# Patient Record
Sex: FEMALE | Race: WHITE
[De-identification: names, ages, dates, MRNs, and addresses within clinical notes are randomized per-mention and may not be internally consistent; named-entity substitution may affect disease eponyms.]

---

## 2019-11-10 NOTE — NUR
JULIA FROM SNF FOR NAUSEA, VOMITING AND DIARRHEA. TO ER BED 4, HOOKED TO 
MONITOR, CHANGED TO HOSP [facility], AWAITING MD LOPEZ.

## 2019-11-10 NOTE — NUR
MS RN NOTE



PT ARRIVED TO FLOOR VIA GURNEY ACCOMPANIED BY ER STAFF. RECEIVED PT IN STABLE CONDITION. A/O 
X1. PT CURRENTLY DROWSY, AND SLEEPY, NEEDED TO REPEAT QUESTIONS BEFORE SHE CAN ANSWER. NO 
SIGNS OF SOB OR DISTRESS, NO INDICATION OF PAIN. NO N/V NOTED. IV IN L AC #20 IN PLACE S/L. 
ALL CURRENT NEEDS ATTENDED TO. BED LOW, LOCKED, UPPER RAILS UP, AND CALL LIGHT WITHIN REACH. 
PT BODY ASSESSED, AND BELONGINGS ACCOUNTED AND SIGNED FOR. WILL CONT. TO MONITOR.

## 2019-11-11 NOTE — NUR
RN NOTE



PT PULLED OUT IV AT THIS TIME AND AT RISK FOR FALLS DUE HER TRYING TO GET OUT OF BED, CHARGE 
MALLORIE SOMERS MADE AWARE REQUESTED SITTER AT THIS TIME. WILL MONITOR ACCORDINGLY

## 2019-11-11 NOTE — NUR
MS RN NOTE



PT REMAINS IN STABLE CONDITION. A/O X1. PT CURRENTLY ASLEEP. NO SIGNS OF SOB OR DISTRESS, NO 
INDICATION OF PAIN. NO N/V NOTED. IV IN L AC #20 IN PLACE WITH IVF INFUSING, TOLERATING 
WELL. ALL CURRENT NEEDS ATTENDED TO. BED LOW, LOCKED, UPPER RAILS UP, AND CALL LIGHT WITHIN 
REACH AND ENDORSE TO NEXT SHIFT FOR GRACIE.

## 2019-11-11 NOTE — NUR
RN CLOSING NOTE



PT IN BED AT LOWEST AND LOCKED POSITION WITH SIDE RAILS UP X2, A/O X1-2 BREATHING EVEN AND 
UNLABORED ON RA, IV IS PATENT AND INTACT, SITTER AT BEDSIDE, CURRENTLY NPO, SAFETY 
PRECAUTIONS IN PLACE, CALL LIGHT IN REACH, ALL NEEDS ATTENDED TO THROUGHOUT SHIFT, WILL 
ENDORSE TO NIGHT RN FOR GRACIE.

## 2019-11-11 NOTE — NUR
RN NOTE 



PHARMACY CALLED REGARDING IV PHOSPHORUS, INFORMED THAT IT IS ON ITS WAY, WILL AWAIT FOR MED 
TO BE BROUGHT UP

## 2019-11-11 NOTE — NUR
RN NOTES



RECEIVED PATIENT AWAKE IN BED CALM RESTING COMFORTABLY, IV ACCESS INTACT AND PATENT, SITTER 
AT BEDSIDE, SAFETY MEASURES INPLACE, ASPIRATION PRECAUTION MAINTAINED, CALL LIGHT WITHIN 
EASY REACH, NO SIGNS OF DISTRESS, WILL CONTINUE TO MONITOR ACCORDINGLY.

## 2019-11-11 NOTE — NUR
RN OPENING NOTE



PT WAS RECEIVED IN BED AT LOWEST AND LOCKED POSITION WITH SIDE RAILS UP X2, A/O X1-2 
BREATHING EVEN AND UNLABORED ON RA, ON BEDREST SKIN INTACT, IV IS PATENT AND INTACT, NOTED 
THAT PT IS NPO AWAITING GI CONSULT, SAFETY PRECAUTIONS IN PLACE, CALL LIGHT IN REACH, WILL 
MONITOR ACCORDINGLY

## 2019-11-12 NOTE — NUR
RN NOTES



ALL NEEDS ATTENDED AND MET. BILATERAL SOFT WRIST RESTRAINTS ON, MONITORED FOR ADEQUATE 
CIRCULATION AND SKIN INTEGRITY, REPOSITIONED FOR COMFORT, SITTER AT BEDSIDE, SAFETY MEASURES 
IN PLACE,ENDORSED TO AM NURSE FOR CONTINUITY OF CARE.

## 2019-11-12 NOTE — NUR
RN NOTES



ATIVAN 0.5 MG IV GIVEN AS PER MD ORDER FOR AGITATION, AND RESTLESSNESS, SITTER AT BEDSIDE. 
SAFETY MEASURES INPLACE.

## 2019-11-12 NOTE — NUR
rn notes/assessment:

pt in bed, awake, a/o x1. sitter at bed side. received with bilateral soft wrist restraint. 
pt trying to hit staff and throw food and liquids. iv noted on left fa g 22 patent and 
flushing well, infusing with d51/2 ns at 80ml/hr. no s/s of iv infiltration noted. restraint 
protocol followed. pt able to move and wiggle arm and hands. with good capillary refill 
noted. pedal pulses intact. no s/s of impediment in circulation noted. ble offloaded on 
pillows. safety precautions for fall engaged, call light in reach, will continue monitoring 
pt.

## 2019-11-12 NOTE — NUR
SPOKE WITH PATIENT'S  CONSERVATIVE DILSHAD DOWD TO GET A CONSENT FOR EGD. CONSERVATOR 
CANNOT SIGH A CONSENT FOR INVASIVE PROCEDURES. SHE WILL SEND A FORM THAT HAS TO BE SIGHED BY 
MEDICAL DOCTOR AND PHYSIATRIST AND THAN SEND BACK TO HER

## 2019-11-12 NOTE — NUR
RN NOTES



NEW IV INSERTED IN LEFT FOREARM GAUGE 22, SITTER AT BEDSIDE, WILL CONTINUE TO MONITOR 
ACCORDINGLY.

## 2019-11-12 NOTE — NUR
PATIENT IN BED.A/OX1, REMAINS CONFUSED. VS ARE STABLE AND PATIENT ON ROOM AIR.  BILATERAL 
SOFT WRIST RESTRAINTS ON, MONITORED FOR ADEQUATE CIRCULATION AND SKIN INTEGRITY, SITTER AT 
BEDSIDE, SAFETY MEASURES IN PLACE,ENDORSED TO NEXT SHIFT NURSE FOR CONTINUITY OF CARE.

## 2019-11-13 NOTE — NUR
RN CLOSING NOTES:

PT REMAINS ON ASPIRATION PRECAUTIONS. BILATERAL SOFT WRIST RESTRAINT IN PLACED, BILATERAL 
RADIAL PULSES INTACT, WITH GOOD CAPILLARY REFILL NOTED, PT ABLE TO MOVE AND WIGGLE ARMS. NO 
S/S OF IMPEDIMENT IN CIRCULATION NOTED. BLE OFFLOADED ON PILLOWS. SAFETY PRECAUTIONS FOR 
FALL REMAINS ENGAGED, CALL LIGHT IN REACH, ENDORSE TO ALTAF RN FOR CONTINUITY OF CARE.

## 2019-11-13 NOTE — NUR
RN MS NOTES

 CALLED AND SPOKE TO SILVINA NOBLES RECEIVING NURSE AT [facility] TO 
GIVE REPORT. [phone]

 REPORT GIVEN TO SILVINA NOBLES PATIENT WILL BE GOING TO UNIT TWO.

TRANSPORTATION AT BEDSIDE, FIRST MED, DISCHARGE PAPERWORK GIVEN TO AMBULANCE STAFF.

IV SITE REMOVED, ID BAND REMOVED, NO PERSONAL BELONGINGS NOTED WITH PATIENT AT THIS TIME.

## 2019-11-13 NOTE — NUR
RN MS NOTES

RECEIVED CALL FROM  FROM [facility] STATING, "PATIENT WILL BE ACCEPTED TO [facility] AND BED IS AVAILABLE AND TRANSPORTATION WILL BE PROVIDED 
WITHIN 30-40 MINS".

CALL BACK NUMBER [phone]

## 2019-11-13 NOTE — NUR
RN MS NOTES

 RECEIVED PATIENT IN BED AWAKE ALERT AND ORIENTED X1, ABLE TO MAKE SIMPLE NEEDS KNOWN NOTED, 
FORGETFUL, RESPIRATIONS EVEN AND UNLABORED WITH EQUAL RISE AND FALL OF CHEST, DENIES ANY 
PAIN OR DISCOMFORT AT THIS TIME IV SITE TO RIGHT WRIST #20G INTACT AND PATENT, NO REDNESS, 
NO INFILTRATION PRESENT, ORIENTED TO STAFF AND CALL LIGHT AND KEPT WITHIN REACH, SAFETY 
PRECAUTIONS IN PLACE, LOW BED AND LOCKED, BED ALARM IN PLACE, SITTER AT BEDSIDE, ALL NEEDS 
ATTENDED AT THIS TIME DISCUSSED PLAN OF CARE, AWAITING TO BE DISCHARGED. AT THIS TIME 
REMAINS COMFORTABLE.

## 2019-11-13 NOTE — NUR
PATIENT REMAIN STABLE ON ROOM AIR, SITTER AT BEDSIDE. PATIENT COOPERATIVE AND ALERT 
/ORIENTED X2. ALL NEEDS ATTENDED. PATIENT ON SOFT DIET TOLERATED WELL. PATIENT AMBULATORY 
WITH ASSISTANCE. IV LINE REMAINS INTACT AND PATENT H/L. SAFETY PRECAUTIONS IN PLACE. CALL 
LIGHT WITHIN REACH. WILL ENDORSE TO NEXT SHIFT FOR GRACIE.

## 2019-11-13 NOTE — NUR
RN MS CLOSING NOTES

 PATIENT LEFT IN STABLE CONDITION WITH FIRST [facility] AMBULANCE STAFF. 

PATIENT REFUSED HALDOL AS SCHEDULED.

## 2019-11-13 NOTE — NUR
WOUND CARE CONSULT: PT PRESENTS WITH INTACT SKIN. PT IS INCONTINENT. RECOMMENDATIONS MADE 
FOR SKIN PROTECTION. DISCUSSED WITH NURSING STAFF. WILL SEE PRN. CURRENT BLESSING SCORE IS 13.

## 2019-11-27 ENCOUNTER — HOSPITAL ENCOUNTER (INPATIENT)
Dept: HOSPITAL 54 - ER | Age: 74
LOS: 4 days | Discharge: PSYCHIATRIC OR SUBSTANCE USE FACILITY | DRG: 388 | End: 2019-12-01
Attending: NURSE PRACTITIONER | Admitting: NURSE PRACTITIONER
Payer: MEDICARE

## 2019-11-27 VITALS — WEIGHT: 138 LBS | BODY MASS INDEX: 25.4 KG/M2 | HEIGHT: 62 IN

## 2019-11-27 DIAGNOSIS — I25.10: ICD-10-CM

## 2019-11-27 DIAGNOSIS — R45.851: ICD-10-CM

## 2019-11-27 DIAGNOSIS — K44.9: ICD-10-CM

## 2019-11-27 DIAGNOSIS — D68.69: ICD-10-CM

## 2019-11-27 DIAGNOSIS — G89.29: ICD-10-CM

## 2019-11-27 DIAGNOSIS — K59.09: ICD-10-CM

## 2019-11-27 DIAGNOSIS — F03.90: ICD-10-CM

## 2019-11-27 DIAGNOSIS — K22.0: ICD-10-CM

## 2019-11-27 DIAGNOSIS — J44.9: ICD-10-CM

## 2019-11-27 DIAGNOSIS — R91.1: ICD-10-CM

## 2019-11-27 DIAGNOSIS — K56.7: Primary | ICD-10-CM

## 2019-11-27 DIAGNOSIS — E43: ICD-10-CM

## 2019-11-27 DIAGNOSIS — F20.9: ICD-10-CM

## 2019-11-27 DIAGNOSIS — E83.42: ICD-10-CM

## 2019-11-27 LAB
ALBUMIN SERPL BCP-MCNC: 3.7 G/DL (ref 3.4–5)
ALP SERPL-CCNC: 56 U/L (ref 46–116)
ALT SERPL W P-5'-P-CCNC: 25 U/L (ref 12–78)
AST SERPL W P-5'-P-CCNC: 19 U/L (ref 15–37)
BASOPHILS # BLD AUTO: 0 /CMM (ref 0–0.2)
BASOPHILS NFR BLD AUTO: 0.1 % (ref 0–2)
BILIRUB DIRECT SERPL-MCNC: 0.1 MG/DL (ref 0–0.2)
BILIRUB SERPL-MCNC: 0.4 MG/DL (ref 0.2–1)
BILIRUB UR QL STRIP: (no result)
BUN SERPL-MCNC: 36 MG/DL (ref 7–18)
CALCIUM SERPL-MCNC: 9.9 MG/DL (ref 8.5–10.1)
CHLORIDE SERPL-SCNC: 104 MMOL/L (ref 98–107)
CO2 SERPL-SCNC: 23 MMOL/L (ref 21–32)
COLOR UR: (no result)
CREAT SERPL-MCNC: 1.3 MG/DL (ref 0.6–1.3)
EOSINOPHIL NFR BLD AUTO: 0.1 % (ref 0–6)
GLUCOSE SERPL-MCNC: 117 MG/DL (ref 74–106)
HCT VFR BLD AUTO: 41 % (ref 33–45)
HGB BLD-MCNC: 13.2 G/DL (ref 11.5–14.8)
KETONES UR STRIP-MCNC: 15 MG/DL
LIPASE SERPL-CCNC: 103 U/L (ref 73–393)
LYMPHOCYTES NFR BLD AUTO: 0.3 /CMM (ref 0.8–4.8)
LYMPHOCYTES NFR BLD AUTO: 1.8 % (ref 20–44)
LYMPHOCYTES NFR BLD MANUAL: 0 % (ref 16–48)
MCHC RBC AUTO-ENTMCNC: 32 G/DL (ref 31–36)
MCV RBC AUTO: 86 FL (ref 82–100)
MONOCYTES NFR BLD AUTO: 0.7 /CMM (ref 0.1–1.3)
MONOCYTES NFR BLD AUTO: 3.8 % (ref 2–12)
MONOCYTES NFR BLD MANUAL: 2 % (ref 0–11)
NEUTROPHILS # BLD AUTO: 16.2 /CMM (ref 1.8–8.9)
NEUTROPHILS NFR BLD AUTO: 94.2 % (ref 43–81)
NEUTS BAND NFR BLD MANUAL: 13 % (ref 0–5)
NEUTS SEG NFR BLD MANUAL: 85 % (ref 42–76)
PH UR STRIP: 5 [PH] (ref 5–8)
PLATELET # BLD AUTO: 283 /CMM (ref 150–450)
POTASSIUM SERPL-SCNC: 4.3 MMOL/L (ref 3.5–5.1)
PROT SERPL-MCNC: 7.8 G/DL (ref 6.4–8.2)
PROT UR QL STRIP: 30 MG/DL
RBC # BLD AUTO: 4.78 MIL/UL (ref 4–5.2)
RBC #/AREA URNS HPF: (no result) /HPF (ref 0–2)
SODIUM SERPL-SCNC: 140 MMOL/L (ref 136–145)
UROBILINOGEN UR STRIP-MCNC: 1 EU/DL
WBC #/AREA URNS HPF: (no result) /HPF
WBC #/AREA URNS HPF: (no result) /HPF (ref 0–3)
WBC NRBC COR # BLD AUTO: 17.2 K/UL (ref 4.3–11)

## 2019-11-27 PROCEDURE — C9113 INJ PANTOPRAZOLE SODIUM, VIA: HCPCS

## 2019-11-27 PROCEDURE — G0378 HOSPITAL OBSERVATION PER HR: HCPCS

## 2019-11-27 NOTE — NUR
[id number] FROM [facility] DANNA C/O L SIDE ABDOMINAL PAIN X5 HRS +VOMITTING. PT 
AAOX3, VSS. DENIES CP, SOB, DIZZINESS @ THIS TIME. PT SEEN & EVAL'D BY DR. YOUNG. MEDICATED AS ORDERED, PT BUSTER WELL. WILL  CONT TO MONITOR.

## 2019-11-28 VITALS — SYSTOLIC BLOOD PRESSURE: 94 MMHG | DIASTOLIC BLOOD PRESSURE: 50 MMHG

## 2019-11-28 VITALS — SYSTOLIC BLOOD PRESSURE: 90 MMHG | DIASTOLIC BLOOD PRESSURE: 50 MMHG

## 2019-11-28 VITALS — SYSTOLIC BLOOD PRESSURE: 85 MMHG | DIASTOLIC BLOOD PRESSURE: 56 MMHG

## 2019-11-28 VITALS — DIASTOLIC BLOOD PRESSURE: 50 MMHG | SYSTOLIC BLOOD PRESSURE: 94 MMHG

## 2019-11-28 LAB
BASOPHILS # BLD AUTO: 0.1 /CMM (ref 0–0.2)
BASOPHILS NFR BLD AUTO: 0.3 % (ref 0–2)
EOSINOPHIL NFR BLD AUTO: 0 % (ref 0–6)
HCT VFR BLD AUTO: 38 % (ref 33–45)
HEMOCCULT STL QL: NEGATIVE
HGB BLD-MCNC: 12 G/DL (ref 11.5–14.8)
LYMPHOCYTES NFR BLD AUTO: 0.9 /CMM (ref 0.8–4.8)
LYMPHOCYTES NFR BLD AUTO: 4.2 % (ref 20–44)
MAGNESIUM SERPL-MCNC: 2.9 MG/DL (ref 1.8–2.4)
MCHC RBC AUTO-ENTMCNC: 32 G/DL (ref 31–36)
MCV RBC AUTO: 87 FL (ref 82–100)
MONOCYTES NFR BLD AUTO: 10.4 % (ref 2–12)
MONOCYTES NFR BLD AUTO: 2.2 /CMM (ref 0.1–1.3)
NEUTROPHILS # BLD AUTO: 18 /CMM (ref 1.8–8.9)
NEUTROPHILS NFR BLD AUTO: 85.1 % (ref 43–81)
PHOSPHATE SERPL-MCNC: 4.1 MG/DL (ref 2.5–4.9)
PLATELET # BLD AUTO: 243 /CMM (ref 150–450)
RBC # BLD AUTO: 4.33 MIL/UL (ref 4–5.2)
WBC NRBC COR # BLD AUTO: 21.2 K/UL (ref 4.3–11)

## 2019-11-28 RX ADMIN — METRONIDAZOLE SCH MG: 500 TABLET ORAL at 13:07

## 2019-11-28 RX ADMIN — VANCOMYCIN HYDROCHLORIDE SCH MG: 125 CAPSULE ORAL at 12:25

## 2019-11-28 RX ADMIN — SODIUM CHLORIDE SCH MG: 9 INJECTION, SOLUTION INTRAVENOUS at 08:24

## 2019-11-28 RX ADMIN — LEVOFLOXACIN SCH MG: 500 TABLET, FILM COATED ORAL at 08:24

## 2019-11-28 RX ADMIN — SODIUM CHLORIDE PRN MLS/HR: 9 INJECTION, SOLUTION INTRAVENOUS at 10:25

## 2019-11-28 RX ADMIN — METRONIDAZOLE SCH MG: 500 TABLET ORAL at 20:53

## 2019-11-28 RX ADMIN — VANCOMYCIN HYDROCHLORIDE SCH MG: 125 CAPSULE ORAL at 17:14

## 2019-11-28 RX ADMIN — VANCOMYCIN HYDROCHLORIDE SCH MG: 125 CAPSULE ORAL at 08:24

## 2019-11-28 NOTE — NUR
MS RN ADMITTING NOTES:



RECEIVED PATIENT FROM ER VIA GURNEY, ACCOMPANIED BY ER STAFF. REPORT GIVEN BY RICHIE. 
PATIENT ARRIVED IN STABLE CONDITION. NO SOB NOTED, NO S/S OF ACUTE DISTRESS. NO INDICATIONS 
OF PAIN, NAUSEA AND VOMITING AT THIS TIME. AWAITING FOR ADMITTING ORDERS. MD MADE AWARE OF 
PATIENT ARRIVAL TO FLOOR. WILL CONTINUE TO MONITOR PATIENT AND ENDORSE FOR GRACIE.

## 2019-11-28 NOTE — NUR
MS RN NOTES:



VITAL SIGNS TAKEN AT 0630. BP: 102/65. HR:108, O2 SAT:96%. TEMP:98. PATIENT IS A/OX3-4. NO 
SOB NOTED. NO S/S OF DISTRESS. NO COMPLAINS OF PAIN AT THIS TIME. WILL ENDORSE TO DAY SHIFT 
NURSE FOR GRACIE.

## 2019-11-28 NOTE — NUR
MS RN NOTES

RECEIVED ON BED A/O X2-3,WITH EPISODE OF CONFUSION.IVF OF NS AT 75ML/HR RATE IN PROGRESS VIA 
IV PUMP ON LEFT AC.ADVISED NOT TO BED LEFT ARM TO PREVENT IV PUMP OF MAKING SOUNDS.ISOLATION 
PRECAUTION FOR POSSIBLE C-DIFF,PENDING TEST RESULT.JULIETA ABDOMINAL PAIN AT THE MOMENT,NO 
LOOSE BOWEL MOVEMENT NOTED.FALL PRECAUTION OBSERVED.CALL LIGHT IN REACH,NEEDS ANTICIPATED.

## 2019-11-28 NOTE — NUR
MS RN CLOSING NOTES



PT IN BED, AWAKE. A/O X3 WITH EPISODES OF FORGETFULNESS.  PT TOLERATING RA AT THIS TIME, 
WITH NO ACUTE RESPIRATORY DISTRESS NOTED. PT DENIES ANY PAIN OR DISCOMFORT AT THIS TIME. IVF 
NS AT 75 ML/HR TO LAC G20,INTACT AND FLUID INFUSING WELL. ALL NEEDS AND CARE ATTENDED. PT 
KEPT COMFORTABLE. CALL LIGHT KEPT WITHIN REACH. PT'S BED IN LOWEST, LOCKED POSITION WITH 
SRX3. WILL ENDORSE TO INCOMING NIGHT NURSE FOR GRACIE.

## 2019-11-28 NOTE — NUR
PT SLEEPING COMFORTABLY IN [facility]. NO SIGNS OF DISTRESS NOTED. PT VITAL SIGNS 
STABLE. WILL CONT TO MONITOR PT.

## 2019-11-28 NOTE — NUR
MS RN OPENING NOTES



RECEIVED PT IN BED, AWAKE. A/O X4 PER NIGHT NURSE, NEW ADMIT. PT TOLERATING RA AT THIS TIME, 
WITH NO ACUTE RESPIRATORY DISTRESS NOTED. PT DENIES ANY PAIN OR DISCOMFORT AT THIS TIME. PT 
DENIES ANY QUESTIONS OR CONCERN AT THIS TIME. SKIN ASSESSED, SKIN INTACT. PIV TO LAC G20, 
FLUSHED WITH NS, INTACT AND OPERATIONAL. PT KEPT COMFORTABLE. CALL LIGHT KEPT WITHIN REACH. 
PT'S BED IN LOWEST, LOCKED POSITION WITH SRX3. WILL CONTINUE PLAN OF CARE.

## 2019-11-29 VITALS — DIASTOLIC BLOOD PRESSURE: 63 MMHG | SYSTOLIC BLOOD PRESSURE: 115 MMHG

## 2019-11-29 VITALS — DIASTOLIC BLOOD PRESSURE: 66 MMHG | SYSTOLIC BLOOD PRESSURE: 110 MMHG

## 2019-11-29 VITALS — DIASTOLIC BLOOD PRESSURE: 55 MMHG | SYSTOLIC BLOOD PRESSURE: 110 MMHG

## 2019-11-29 LAB
ALBUMIN SERPL BCP-MCNC: 2.9 G/DL (ref 3.4–5)
ALP SERPL-CCNC: 48 U/L (ref 46–116)
ALT SERPL W P-5'-P-CCNC: 22 U/L (ref 12–78)
AST SERPL W P-5'-P-CCNC: 16 U/L (ref 15–37)
BASOPHILS # BLD AUTO: 0 /CMM (ref 0–0.2)
BASOPHILS NFR BLD AUTO: 0.2 % (ref 0–2)
BILIRUB SERPL-MCNC: 0.3 MG/DL (ref 0.2–1)
BUN SERPL-MCNC: 16 MG/DL (ref 7–18)
CALCIUM SERPL-MCNC: 8.8 MG/DL (ref 8.5–10.1)
CHLORIDE SERPL-SCNC: 107 MMOL/L (ref 98–107)
CHOLEST SERPL-MCNC: 140 MG/DL (ref ?–200)
CO2 SERPL-SCNC: 25 MMOL/L (ref 21–32)
CREAT SERPL-MCNC: 1 MG/DL (ref 0.6–1.3)
EOSINOPHIL NFR BLD AUTO: 1.5 % (ref 0–6)
GLUCOSE SERPL-MCNC: 113 MG/DL (ref 74–106)
HCT VFR BLD AUTO: 34 % (ref 33–45)
HDLC SERPL-MCNC: 55 MG/DL (ref 40–60)
HGB BLD-MCNC: 11.2 G/DL (ref 11.5–14.8)
IRON SERPL-MCNC: 20 UG/DL (ref 50–175)
LDLC SERPL DIRECT ASSAY-MCNC: 71 MG/DL (ref 0–99)
LYMPHOCYTES NFR BLD AUTO: 1.3 /CMM (ref 0.8–4.8)
LYMPHOCYTES NFR BLD AUTO: 12.7 % (ref 20–44)
MAGNESIUM SERPL-MCNC: 2.3 MG/DL (ref 1.8–2.4)
MCHC RBC AUTO-ENTMCNC: 33 G/DL (ref 31–36)
MCV RBC AUTO: 85 FL (ref 82–100)
MONOCYTES NFR BLD AUTO: 0.7 /CMM (ref 0.1–1.3)
MONOCYTES NFR BLD AUTO: 6.4 % (ref 2–12)
NEUTROPHILS # BLD AUTO: 8 /CMM (ref 1.8–8.9)
NEUTROPHILS NFR BLD AUTO: 79.2 % (ref 43–81)
PHOSPHATE SERPL-MCNC: 2.9 MG/DL (ref 2.5–4.9)
PLATELET # BLD AUTO: 241 /CMM (ref 150–450)
POTASSIUM SERPL-SCNC: 4.3 MMOL/L (ref 3.5–5.1)
PROT SERPL-MCNC: 6.6 G/DL (ref 6.4–8.2)
RBC # BLD AUTO: 4 MIL/UL (ref 4–5.2)
SODIUM SERPL-SCNC: 140 MMOL/L (ref 136–145)
TIBC SERPL-MCNC: 274 UG/DL (ref 250–450)
TRIGL SERPL-MCNC: 47 MG/DL (ref 30–150)
WBC NRBC COR # BLD AUTO: 10.1 K/UL (ref 4.3–11)

## 2019-11-29 RX ADMIN — VANCOMYCIN HYDROCHLORIDE SCH MG: 125 CAPSULE ORAL at 06:02

## 2019-11-29 RX ADMIN — LEVOFLOXACIN SCH MG: 500 TABLET, FILM COATED ORAL at 07:06

## 2019-11-29 RX ADMIN — METRONIDAZOLE SCH MG: 500 TABLET ORAL at 13:21

## 2019-11-29 RX ADMIN — SODIUM CHLORIDE SCH MG: 9 INJECTION, SOLUTION INTRAVENOUS at 08:39

## 2019-11-29 RX ADMIN — VANCOMYCIN HYDROCHLORIDE SCH MG: 125 CAPSULE ORAL at 00:18

## 2019-11-29 RX ADMIN — METRONIDAZOLE SCH MG: 500 TABLET ORAL at 20:06

## 2019-11-29 RX ADMIN — METRONIDAZOLE SCH MG: 500 TABLET ORAL at 05:09

## 2019-11-29 RX ADMIN — VANCOMYCIN HYDROCHLORIDE SCH MG: 125 CAPSULE ORAL at 23:30

## 2019-11-29 RX ADMIN — VANCOMYCIN HYDROCHLORIDE SCH MG: 125 CAPSULE ORAL at 13:21

## 2019-11-29 RX ADMIN — VANCOMYCIN HYDROCHLORIDE SCH MG: 125 CAPSULE ORAL at 17:10

## 2019-11-29 RX ADMIN — SODIUM CHLORIDE PRN MLS/HR: 9 INJECTION, SOLUTION INTRAVENOUS at 22:23

## 2019-11-29 NOTE — NUR
MS RN CLOSING NOTES



ALERT AND ORIENTED X1-2. PATIENT WITH CONFUSION AND FORGETFULNESS.  NO SOB. DENIES ANY C/O 
PAIN NOR DISCOMFORT AT THIS TIME. LAC # 22 INTACT AND PATENT INFUSING NS @ 75ML/HR BUSTER WELL. 
AMBULATORY WITH STEADY GAIT. BRP. BED IN LOWEST POSITION, LOCKED. BED SIDERAILS UP X2. CALL 
LIGHT WITHIN REACH. IN NO APPARENT DISTRESS.

## 2019-11-29 NOTE — NUR
MS RN NOTES



PATIENT AGREED TO HAVE IV INSERTION AFTER NUMEROUS ATTEMPTS. LEFT AC # 22 INTACT AND PATENT.

## 2019-11-29 NOTE — NUR
MS RN OPENING NOTES



RECEIVED PATIENT ALERT AND AWAKE IN BED WATCHING TV. NO SOB. DENIES ANY C/O PAIN NOR 
DISCOMFORT AT THIS TIME. LAC # 20 INTACT AND PATENT INFUSING NS @ 75ML/HR BUSTER WELL. BED IN 
LOWEST POSITION, LOCKED. BED SIDERAILS UP X2. CALL LIGHT WITHIN REACH.

## 2019-11-29 NOTE — NUR
MS RN NOTES

APPEARS CONFUSED,MEDS COMPLIANT,IVF STILL REFUSED,NO FALLS ,NO INJURY,IN NO ACUTE 
DISTRESS.WILL ENDORSE TO DAY NURSE FOR GRACIE.

## 2019-11-29 NOTE — NUR
MS RN OPENING NOTES



Received patient A/O x1-2, with confusion, awake on bed on RA. No s/sx of discomfort noted 
at this time. On golytely to help her move bowel but patient refused to take the solution. 
On fall precautions, bed alarm on. Kept on bed clean, dry and comfortable. Will continue to 
monitor accordingly.

## 2019-11-30 VITALS — DIASTOLIC BLOOD PRESSURE: 60 MMHG | SYSTOLIC BLOOD PRESSURE: 115 MMHG

## 2019-11-30 VITALS — DIASTOLIC BLOOD PRESSURE: 54 MMHG | SYSTOLIC BLOOD PRESSURE: 111 MMHG

## 2019-11-30 VITALS — SYSTOLIC BLOOD PRESSURE: 110 MMHG | DIASTOLIC BLOOD PRESSURE: 54 MMHG

## 2019-11-30 RX ADMIN — METRONIDAZOLE SCH MG: 500 TABLET ORAL at 12:08

## 2019-11-30 RX ADMIN — VANCOMYCIN HYDROCHLORIDE SCH MG: 125 CAPSULE ORAL at 12:08

## 2019-11-30 RX ADMIN — METRONIDAZOLE SCH MG: 500 TABLET ORAL at 20:13

## 2019-11-30 RX ADMIN — METRONIDAZOLE SCH MG: 500 TABLET ORAL at 05:13

## 2019-11-30 RX ADMIN — SODIUM CHLORIDE SCH MG: 9 INJECTION, SOLUTION INTRAVENOUS at 08:36

## 2019-11-30 RX ADMIN — LEVOFLOXACIN SCH MG: 500 TABLET, FILM COATED ORAL at 05:13

## 2019-11-30 RX ADMIN — VANCOMYCIN HYDROCHLORIDE SCH MG: 125 CAPSULE ORAL at 05:13

## 2019-11-30 RX ADMIN — SODIUM CHLORIDE PRN MLS/HR: 9 INJECTION, SOLUTION INTRAVENOUS at 20:44

## 2019-11-30 NOTE — NUR
MS RN NOTES

Per Patient, she has suicidal ideations. Her plan for suicide is by pills. Notified Ana M MARTINEZ.

## 2019-11-30 NOTE — NUR
MS RN CLOSING NOTES

Patient awake and resting in bed. A/O x 1-2. VS stable with no acute distress. Breathing 
even and unlabored on room air with no respiratory distress. Denies pain. 22g PIV on LAC 
clean, intact, patent and flushing well with NS running at 75ml/hr. Safety precautions in 
place. Bed locked and set to lowest position with side rails x 2 up. All needs rendered at 
this time. Call light within reach. Will continue to monitor.

## 2019-11-30 NOTE — NUR
MS RN CLOSING NOTES



Patient asleep on bed, easily awaken. On RA, no SOB/respiratory distress, no s/sx of 
discomfort noted at this time. All due meds given as ordered, able to take whole pills. No 
new complaints. All nursing needs attended. Kept on bed clean, dry and comfortable. On fall 
and aspiration precautions. Call light within easy reach. Bed alarm on. Endorsed to the next 
shift.

## 2019-11-30 NOTE — NUR
MS RN OPENING NOTES

Received Patient awake and resting in bed. A/O x 1-2. VS stable with no acute distress. 
Breathing even and unlabored on room air with no respiratory distress. Denies pain. 22g PIV 
on LAC clean, intact, patent and flushing well with NS running at 75ml/hr. Safety 
precautions in place. Bed locked and set to lowest position with side rails x 2 up. All 
needs rendered at this time. Call light within reach. Will continue to monitor.

## 2019-11-30 NOTE — NUR
11/30/19 Per hospitalist pt stable for tx back to [facility] 

[phone] spoke to Evelio requesting clinicals to be fax to [phone] 

faxed confirmation received. Awaiting for bed. Ambulance on will call via 

[facility] ambulance [phone]. Nurse made aware. 

-------------------------------------------------------------------------------

Addendum: 11/30/19 at 2053 by JAQUAN JONES CMG

-------------------------------------------------------------------------------

Amended: Links added.

## 2019-11-30 NOTE — NUR
MS RN OPENING NOTES



Received patient A/O x2, awake on bed. On RA, no SOB/respiratory distress noted. No 
complaints made at this time. Discussed to patient the POC, patient verbalized 
understanding. Kept on bed clean, dry and comfortable. Call light within easy reach. On fall 
and aspiration precautions. Will continue to monitor accordingly.

## 2019-11-30 NOTE — NUR
MS RN NOTES



Received a call from [facility], spoke with Evelio requesting for new CBC of the patient. 
Notified charge nurse of the concern. Called back to Evelio informing them that concerned will 
be relayed to the MD discharging the patient. Will endorsed to the morning nurse.

## 2019-12-01 VITALS — SYSTOLIC BLOOD PRESSURE: 128 MMHG | DIASTOLIC BLOOD PRESSURE: 72 MMHG

## 2019-12-01 LAB
BASOPHILS # BLD AUTO: 0 /CMM (ref 0–0.2)
BASOPHILS NFR BLD AUTO: 0.4 % (ref 0–2)
EOSINOPHIL NFR BLD AUTO: 1.1 % (ref 0–6)
HCT VFR BLD AUTO: 38 % (ref 33–45)
HGB BLD-MCNC: 12.1 G/DL (ref 11.5–14.8)
LYMPHOCYTES NFR BLD AUTO: 1.3 /CMM (ref 0.8–4.8)
LYMPHOCYTES NFR BLD AUTO: 20.4 % (ref 20–44)
MCHC RBC AUTO-ENTMCNC: 32 G/DL (ref 31–36)
MCV RBC AUTO: 86 FL (ref 82–100)
MONOCYTES NFR BLD AUTO: 0.4 /CMM (ref 0.1–1.3)
MONOCYTES NFR BLD AUTO: 5.5 % (ref 2–12)
NEUTROPHILS # BLD AUTO: 4.8 /CMM (ref 1.8–8.9)
NEUTROPHILS NFR BLD AUTO: 72.6 % (ref 43–81)
PLATELET # BLD AUTO: 246 /CMM (ref 150–450)
RBC # BLD AUTO: 4.38 MIL/UL (ref 4–5.2)
WBC NRBC COR # BLD AUTO: 6.6 K/UL (ref 4.3–11)

## 2019-12-01 RX ADMIN — METRONIDAZOLE SCH MG: 500 TABLET ORAL at 05:49

## 2019-12-01 RX ADMIN — SODIUM CHLORIDE SCH MG: 9 INJECTION, SOLUTION INTRAVENOUS at 08:17

## 2019-12-01 RX ADMIN — LEVOFLOXACIN SCH MG: 500 TABLET, FILM COATED ORAL at 05:49

## 2019-12-01 NOTE — NUR
RN OPENING NOTE



PT WAS RECEIVED IN BED AT LOWEST AND LOCKED POSITION WITH SIDE RAILS UP X2, A/O X2 WITH 
PERIODS OF CONFUSION AND FORGETFULNESS, BREATHING EVEN AND UNLABORED ON RA WITH NO S/S OF 
ANY DISTRESS OR PAIN AT THIS TIME, IV IS PATENT AND INTACT, PER NIGHT MALLORIE WILEY 
WANTS NEW CBC REPORT WITH LATEST WBC AND WAS INFORMED THAT TRANSPORTATION CANNOT  PT 
TILL THIS AFTERNOON, SAFETY PRECAUTIONS IN PLACE, CALL LIGHT IN REACH, WILL MONITOR 
ACCORDINGLY

## 2019-12-01 NOTE — NUR
MS RN CLOSING NOTES



Patient on bed, intermittently asleep. Afebrile the whole shift, no complaints of abdominal 
pain, no diarrhea noted within the shift. Patient was able to ambulate to bathroom with 
stand by assist. All due meds taken, tolerated well. For discharge today, accepting facility 
needs latest WBC results. Kept on bed clean dry and comfortable. On fall and aspiration 
precautions. Call light within easy reach. Endorsed to the next shift.

## 2019-12-01 NOTE — NUR
DISCHARGE NOTE



PT WAS D/C AT THIS TIME IN MEDICALLY STABLE CONDITION TO [facility] WHERE REPORT WAS 
GIVEN TO ESSENCE. EMT CREW INFORMED THAT THE PT IS GOING TO ROOM 226-B. IV AND ID BAND WERE 
REMOVED, ALL D/C PAPERWORK, EXITCARE, AND BELONGING LIST WERE DISCUSSED WITH THE PT BUT SHE 
WAS UNABLE TO SIGN DUE TO HER MENTAL STATUS; THEREFORE ANOTHER NURSE SIGNED WITH ME. SKIN 
WAS NOTED TO BE DRY AND INTACT. NURSE AT [facility] INFORMED THAT PT IS BEING D/C WITH 
PO [facility]IOT[facility] AND THAT SHE DOES HAS SI. ALL NEEDS WERE ATTENDED TO DURING HER STAY.